# Patient Record
Sex: FEMALE | Race: WHITE | Employment: FULL TIME | URBAN - METROPOLITAN AREA
[De-identification: names, ages, dates, MRNs, and addresses within clinical notes are randomized per-mention and may not be internally consistent; named-entity substitution may affect disease eponyms.]

---

## 2024-05-16 ENCOUNTER — OFFICE VISIT (OUTPATIENT)
Dept: OBGYN CLINIC | Age: 34
End: 2024-05-16
Payer: COMMERCIAL

## 2024-05-16 VITALS
HEIGHT: 64 IN | WEIGHT: 154 LBS | BODY MASS INDEX: 26.29 KG/M2 | DIASTOLIC BLOOD PRESSURE: 82 MMHG | SYSTOLIC BLOOD PRESSURE: 126 MMHG

## 2024-05-16 DIAGNOSIS — B97.7 HPV IN FEMALE: Primary | ICD-10-CM

## 2024-05-16 PROBLEM — A60.00 GENITAL HERPES: Status: ACTIVE | Noted: 2024-05-16

## 2024-05-16 PROBLEM — Z30.41 ORAL CONTRACEPTIVE PILL SURVEILLANCE: Status: RESOLVED | Noted: 2023-10-16 | Resolved: 2024-05-16

## 2024-05-16 PROBLEM — N92.6 IRREGULAR MENSTRUAL BLEEDING: Status: RESOLVED | Noted: 2023-10-16 | Resolved: 2024-05-16

## 2024-05-16 PROBLEM — B37.31 CANDIDA VAGINITIS: Status: RESOLVED | Noted: 2023-10-16 | Resolved: 2024-05-16

## 2024-05-16 PROCEDURE — 99203 OFFICE O/P NEW LOW 30 MIN: CPT | Performed by: NURSE PRACTITIONER

## 2024-05-16 ASSESSMENT — PATIENT HEALTH QUESTIONNAIRE - PHQ9
2. FEELING DOWN, DEPRESSED OR HOPELESS: NOT AT ALL
1. LITTLE INTEREST OR PLEASURE IN DOING THINGS: NOT AT ALL
SUM OF ALL RESPONSES TO PHQ9 QUESTIONS 1 & 2: 0
SUM OF ALL RESPONSES TO PHQ QUESTIONS 1-9: 0

## 2024-05-16 NOTE — PROGRESS NOTES
Liban Carrillo is a 33 y.o.       Patient here for establishing care.   Patient would like to discuss TTC and conception education along with HPV/HSV counseling.   Patient states that with her last pap smear it also detected HSV. Denies hx of outbreaks.      LAST PAP:  10/13/2023, neg., HPV pos.   Patient denies ever getting Gardasil.      LAST MAMMO:  never     LMP:  Patient's last menstrual period was 2024 (exact date).     BIRTH CONTROL:  none-TTC      TOBACCO USE:  No      Patient's last menstrual period was 2024 (exact date).          Past Medical History:   Diagnosis Date    Headache     resolved since stopping BC    Herpes     History of depression     in her teens-patient states this has resolved    History of vitamin D deficiency        No past surgical history on file.    Family History   Problem Relation Age of Onset    Unknown Father     Anemia Mother     Heart Disease Mother     Breast Cancer Neg Hx     Colon Cancer Neg Hx     Uterine Cancer Neg Hx     Ovarian Cancer Neg Hx        Social History     Socioeconomic History    Marital status:      Spouse name: Not on file    Number of children: Not on file    Years of education: Not on file    Highest education level: Not on file   Occupational History    Not on file   Tobacco Use    Smoking status: Never     Passive exposure: Never    Smokeless tobacco: Never   Vaping Use    Vaping Use: Never used   Substance and Sexual Activity    Alcohol use: Not Currently    Drug use: Never    Sexual activity: Yes     Partners: Male     Birth control/protection: None   Other Topics Concern    Not on file   Social History Narrative    Not on file     Social Determinants of Health     Financial Resource Strain: Low Risk  (2023)    Overall Financial Resource Strain (CARDIA)     Difficulty of Paying Living Expenses: Not hard at all   Food Insecurity: Not on file (2023)   Transportation Needs: Unknown (2023)    PRAPARE - Transportation

## 2024-05-16 NOTE — ASSESSMENT & PLAN NOTE
Recent dx of HPV and HSV with pap 10/2023     Denies any hx outbreaks, + HSV noted on pap    Acog handouts reviewed with pt  Gardasil - not received, VIS reviewed and pt to let us know if she would like to proceed  We discuss etiology, transmission, prevention, treatment, and screening. We discuss limitation with serum hsv testing as well    Recommend pap in 1 year  Pt and spouse desire to TTC soon. We discuss ok to proceed with TTC, she is taking PNV. If pregnancy achieved, this will not limit our ability to do cervical screening  Plan prophylaxis in pregnancy at 36 weeks or sooner with any outbreaks    Reassurance provided to pt and f/u AE in October 2024

## 2024-05-16 NOTE — PROGRESS NOTES
Patient here for establishing care.   Patient would like to discuss TTC and conception education along with HPV/HSV counseling.   Patient states that with her last pap smear it also detected HSV. Denies hx of outbreaks.     LAST PAP:  10/13/2023, neg., HPV pos.   Patient denies ever getting Gardasil.     LAST MAMMO:  never    LMP:  Patient's last menstrual period was 04/22/2024 (exact date).    BIRTH CONTROL:  none-TTC     TOBACCO USE:  No    FAMILY HISTORY OF:   Breast Cancer:  No   Ovarian Cancer:  No   Uterine Cancer:  No   Colon Cancer:  No    Vitals:    05/16/24 1051   BP: 126/82   Site: Left Upper Arm   Position: Sitting   Weight: 69.9 kg (154 lb)   Height: 1.626 m (5' 4\")        INDIANA PHAM RN  05/16/24  11:00 AM

## 2024-07-10 RX ORDER — NORGESTIMATE AND ETHINYL ESTRADIOL
1 KIT DAILY
Qty: 84 TABLET | Refills: 3 | OUTPATIENT
Start: 2024-07-10

## 2024-10-17 ENCOUNTER — OFFICE VISIT (OUTPATIENT)
Dept: OBGYN CLINIC | Age: 34
End: 2024-10-17
Payer: COMMERCIAL

## 2024-10-17 VITALS
BODY MASS INDEX: 26.8 KG/M2 | SYSTOLIC BLOOD PRESSURE: 110 MMHG | HEIGHT: 64 IN | WEIGHT: 157 LBS | DIASTOLIC BLOOD PRESSURE: 68 MMHG

## 2024-10-17 DIAGNOSIS — Z31.69 PROCREATIVE MANAGEMENT COUNSELING: ICD-10-CM

## 2024-10-17 DIAGNOSIS — Z01.419 WOMEN'S ANNUAL ROUTINE GYNECOLOGICAL EXAMINATION: ICD-10-CM

## 2024-10-17 DIAGNOSIS — Z12.4 PAP SMEAR FOR CERVICAL CANCER SCREENING: Primary | ICD-10-CM

## 2024-10-17 PROCEDURE — 99459 PELVIC EXAMINATION: CPT | Performed by: NURSE PRACTITIONER

## 2024-10-17 PROCEDURE — 99395 PREV VISIT EST AGE 18-39: CPT | Performed by: NURSE PRACTITIONER

## 2024-10-17 NOTE — PROGRESS NOTES
Chaperone for Intimate Exam     Chaperone was offer accepted as part of the rooming process    Chaperone: Melissa WARD CMA

## 2024-10-17 NOTE — PROGRESS NOTES
Liban Carrillo is a 33 y.o.  who is here for AE.    Would like to discuss TTC. She and  have been trying for 6 months. Spouse is 35. He does not have any health issues/on any medications. He does vape nicotine. Denies alcohol/drug use for herself or partner. Spouse has never had genital surgery  They have been using period tracker brandy to time intercourse daily for 7 day fertile window (advised to change to every other day).         Patient's last menstrual period was 2024 (exact date).    Menses: Q 24 days, lasting 5 days, cramps on day 1. Heavy for 2 days and then light flow    Birth Control: none    Date of last Cervical Cancer screen (HPV or PAP): 10/13/2023 neg, + HPV    Hx abnormal pap or STD:HPV/HSV    Hx of receiving HPV vaccination:no, discussed at last visit, declined    No breast cancer screening on file    Fam Hx of Breast, ovarian or uterine cancer:denies    Sexually Active:yes    Number of partners in the last year:1          ROS:    Breast: Denies pain, lump or nipple discharge    GYN: Denies pelvic pain, discharge, itching, odor or dysuria.     Constitutional: Negative for chills and fever.     HENT: Negative for severe headaches or vision changes    Respiratory: Negative for cough and shortness of breath.      Cardiovascular: Negative for chest pain and palpitations.     Gastrointestinal: Negative for nausea and vomiting. Negative for diarrhea and constipation    Genitourinary: Negative for dysuria and hematuria.           Past Medical History:   Diagnosis Date    Abnormal Pap smear of cervix 10/13/23    HPV,    Headache     resolved since stopping BC    Herpes     History of depression     in her teens-patient states this has resolved    History of vitamin D deficiency     Migraine     Has since gone away after not continuing to use birth control       History reviewed. No pertinent surgical history.    Family History   Problem Relation Age of Onset    Unknown Father     Anemia

## 2024-10-17 NOTE — PROGRESS NOTES
Pt comes in today for AE.     LAST PAP:  10/13/2023 negative, HPV pos     LAST MAMMO:  never     LMP:  Patient's last menstrual period was 09/25/2024 (exact date).    BIRTH CONTROL:  none -TTC     TOBACCO USE:  No    FAMILY HISTORY OF:   Breast Cancer:  No   Ovarian Cancer:  No   Uterine Cancer:  No   Colon Cancer:  No    Vitals:    10/17/24 1503   BP: 110/68   Site: Left Upper Arm   Position: Sitting   Weight: 71.2 kg (157 lb)   Height: 1.626 m (5' 4\")        Melissa Irwin MA  10/17/24  3:08 PM

## 2024-10-17 NOTE — PATIENT INSTRUCTIONS
Please call when your next period starts. We will schedule an appointment for day 3 and day 21 of your period  Then we will see you in 2-3 months for an ultrasound and to review the lab results    Take a multivitamin that contains Folic Acid 400 mcg daily  We recommend a diet high in vegetables, fruits, whole grains, low fat dairy, and lean meats such as baked chicken and fish. Limit the amount of red meat, sweets, and sugary beverages (such as soda and sweet tea).   Exercise 30 minutes every day.  Drink no more than 200 mg of caffeine each day  Avoid alcohol, tobacco and drugs while trying to conceive  Time intercourse for every 1-2 days starting 5 days before ovulation until 1 day after ovulation.  Use a lubricant such as Pre-Seed that does not inhibit sperm motility

## 2024-10-17 NOTE — ASSESSMENT & PLAN NOTE
TTC x6 months  We discuss most couple will conceive within 12 months of times intercourse around ovulation  Pt is getting +OPKs at home  She would like to complete CD3 and CD 21 labs even though it has not been a full 12 months.  Pt will call with next period to schedule labs and f/u after this for US and visit      Take a multivitamin that contains Folic Acid 400 mcg daily  We recommend a diet high in vegetables, fruits, whole grains, low fat dairy, and lean meats such as baked chicken and fish. Limit the amount of red meat, sweets, and sugary beverages (such as soda and sweet tea).   Exercise 30 minutes every day.  Drink no more than 200 mg of caffeine each day  Avoid alcohol, tobacco and drugs while trying to conceive  Time intercourse for every 1-2 days starting 5 days before ovulation until 1 day after ovulation.  Use a lubricant such as Pre-Seed that does not inhibit sperm motility

## 2024-10-23 ENCOUNTER — LAB (OUTPATIENT)
Dept: OBGYN CLINIC | Age: 34
End: 2024-10-23

## 2024-10-23 DIAGNOSIS — Z31.69 PROCREATIVE MANAGEMENT COUNSELING: ICD-10-CM

## 2024-10-23 LAB
ALBUMIN SERPL-MCNC: 3.9 G/DL (ref 3.5–5)
ALBUMIN/GLOB SERPL: 1.3 (ref 1–1.9)
ALP SERPL-CCNC: 59 U/L (ref 35–104)
ALT SERPL-CCNC: 18 U/L (ref 8–45)
ANION GAP SERPL CALC-SCNC: 12 MMOL/L (ref 9–18)
AST SERPL-CCNC: 17 U/L (ref 15–37)
BILIRUB SERPL-MCNC: 0.3 MG/DL (ref 0–1.2)
BUN SERPL-MCNC: 9 MG/DL (ref 6–23)
CALCIUM SERPL-MCNC: 9.2 MG/DL (ref 8.8–10.2)
CHLORIDE SERPL-SCNC: 103 MMOL/L (ref 98–107)
CO2 SERPL-SCNC: 25 MMOL/L (ref 20–28)
CREAT SERPL-MCNC: 0.61 MG/DL (ref 0.6–1.1)
ERYTHROCYTE [DISTWIDTH] IN BLOOD BY AUTOMATED COUNT: 12.5 % (ref 11.9–14.6)
EST. AVERAGE GLUCOSE BLD GHB EST-MCNC: 108 MG/DL
ESTRADIOL SERPL-MCNC: 19.1 PG/ML
FSH SERPL-ACNC: 4.8 MIU/ML
GLOBULIN SER CALC-MCNC: 3.1 G/DL (ref 2.3–3.5)
GLUCOSE SERPL-MCNC: 80 MG/DL (ref 70–99)
HBA1C MFR BLD: 5.4 % (ref 0–5.6)
HCT VFR BLD AUTO: 40.9 % (ref 35.8–46.3)
HGB BLD-MCNC: 12.5 G/DL (ref 11.7–15.4)
LH SERPL-ACNC: 4.7 MIU/ML
MCH RBC QN AUTO: 27.5 PG (ref 26.1–32.9)
MCHC RBC AUTO-ENTMCNC: 30.6 G/DL (ref 31.4–35)
MCV RBC AUTO: 90.1 FL (ref 82–102)
NRBC # BLD: 0 K/UL (ref 0–0.2)
PLATELET # BLD AUTO: 273 K/UL (ref 150–450)
PMV BLD AUTO: 9.8 FL (ref 9.4–12.3)
POTASSIUM SERPL-SCNC: 4.4 MMOL/L (ref 3.5–5.1)
PROGEST SERPL-MCNC: 0.28 NG/ML
PROLACTIN SERPL-MCNC: 8.1 NG/ML (ref 4.8–23.3)
PROT SERPL-MCNC: 6.9 G/DL (ref 6.3–8.2)
RBC # BLD AUTO: 4.54 M/UL (ref 4.05–5.2)
SODIUM SERPL-SCNC: 140 MMOL/L (ref 136–145)
TSH W FREE THYROID IF ABNORMAL: 2.15 UIU/ML (ref 0.27–4.2)
WBC # BLD AUTO: 7.2 K/UL (ref 4.3–11.1)

## 2024-10-26 LAB
TESTOST FREE SERPL-MCNC: 0.8 PG/ML (ref 0–4.2)
TESTOST SERPL-MCNC: 18 NG/DL (ref 8–60)

## 2024-10-28 ENCOUNTER — TELEPHONE (OUTPATIENT)
Dept: OBGYN CLINIC | Age: 34
End: 2024-10-28

## 2024-10-28 LAB
COLLECTION METHOD: ABNORMAL
CYTOLOGIST CVX/VAG CYTO: ABNORMAL
CYTOLOGY CVX/VAG DOC THIN PREP: ABNORMAL
DATE OF LMP: ABNORMAL
HPV APTIMA: POSITIVE
HPV GENOTYPE REFLEX: ABNORMAL
Lab: ABNORMAL
PAP SOURCE: ABNORMAL
PATH REPORT.FINAL DX SPEC: ABNORMAL
PATHOLOGIST CVX/VAG CYTO: ABNORMAL
PATHOLOGIST PROVIDED ICD: ABNORMAL
RECOM F/U CVX/VAG CYTO: ABNORMAL
STAT OF ADQ CVX/VAG CYTO-IMP: ABNORMAL

## 2024-10-28 NOTE — TELEPHONE ENCOUNTER
Pap LGSIL +HPV. Recommend proceeding with colposcopy.  Please send pt acog handout on abnormal pap/colpo and schedule      We encourage you to stop smoking (if you do)  Take a multivitamin each day  Eat more cruciferous vegetables (sweet potatoes, spinach, kale, papaya, oranges, sweet peppers,and tomatoes) and other things high in antioxidants like green tea, pomegranate, dark chocolate, etc.  This will boost your immune system and help with your abnormal pap smear.   Exercise 30minutes/day  To help prevent the spread of sexually transmitted diseases, condoms must be worn during sexual activity.   If you have not yet received the HPV vaccine, we recommend you complete the series.- SCHEDULE WITH COLPO IF SHE WANTS    Please schedule a follow-up appointment in 12 months for another pap smear.

## 2024-10-30 LAB — MIS SERPL-MCNC: 2.96 NG/ML

## 2024-10-30 NOTE — TELEPHONE ENCOUNTER
Patient informed of results and recommendations. Patient states she will look over the information sent via My Chart, discuss it with Jesusita at her appointment on 12/10 then schedule her colposcopy. Patient was advised that I will schedule her AE for next year that will show up on My Chart. Information sent via My   Chart. Patient voiced understanding. melina

## 2024-11-11 ENCOUNTER — LAB (OUTPATIENT)
Dept: OBGYN CLINIC | Age: 34
End: 2024-11-11

## 2024-11-11 DIAGNOSIS — Z31.69 PROCREATIVE MANAGEMENT COUNSELING: Primary | ICD-10-CM

## 2024-11-11 LAB — PROGEST SERPL-MCNC: 2.35 NG/ML

## 2024-11-12 ENCOUNTER — TELEPHONE (OUTPATIENT)
Dept: OBGYN CLINIC | Age: 34
End: 2024-11-12

## 2024-11-12 NOTE — TELEPHONE ENCOUNTER
Progesterone level low for luteal phase CD 21.    Please call pt and confirm dates of last 3 menstrual cycles. We may have her repeat progesterone level based on these dates OR wait until her f/u visit in december

## 2024-11-16 PROBLEM — Z01.419 WOMEN'S ANNUAL ROUTINE GYNECOLOGICAL EXAMINATION: Status: RESOLVED | Noted: 2024-10-17 | Resolved: 2024-11-16

## 2024-12-09 NOTE — PROGRESS NOTES
Patient here for gyn f/u with US for procreative management.     LAST PAP:  10/18/2024, LSIL, HPV pos.     LAST MAMMO:  never     LMP:  Patient's last menstrual period was 11/21/2024 (exact date).    BIRTH CONTROL:  none-TTC    TOBACCO USE:  No    FAMILY HISTORY OF:   Breast Cancer:  No   Ovarian Cancer:  No   Uterine Cancer:  No   Colon Cancer:  No    Vitals:    12/10/24 1322   BP: 116/62   Site: Right Upper Arm   Position: Sitting   Weight: 72.1 kg (159 lb)   Height: 1.626 m (5' 4\")        INDIANA PHAM RN  12/10/24  1:25 PM

## 2024-12-10 ENCOUNTER — OFFICE VISIT (OUTPATIENT)
Dept: OBGYN CLINIC | Age: 34
End: 2024-12-10
Payer: COMMERCIAL

## 2024-12-10 ENCOUNTER — PROCEDURE VISIT (OUTPATIENT)
Dept: OBGYN CLINIC | Age: 34
End: 2024-12-10
Payer: COMMERCIAL

## 2024-12-10 VITALS
WEIGHT: 159 LBS | SYSTOLIC BLOOD PRESSURE: 116 MMHG | DIASTOLIC BLOOD PRESSURE: 62 MMHG | HEIGHT: 64 IN | BODY MASS INDEX: 27.14 KG/M2

## 2024-12-10 DIAGNOSIS — N92.6 IRREGULAR MENSES: Primary | ICD-10-CM

## 2024-12-10 DIAGNOSIS — B97.7 HPV IN FEMALE: ICD-10-CM

## 2024-12-10 DIAGNOSIS — Z31.69 PROCREATIVE MANAGEMENT COUNSELING: Primary | ICD-10-CM

## 2024-12-10 PROCEDURE — 76830 TRANSVAGINAL US NON-OB: CPT | Performed by: OBSTETRICS & GYNECOLOGY

## 2024-12-10 PROCEDURE — 99213 OFFICE O/P EST LOW 20 MIN: CPT | Performed by: NURSE PRACTITIONER

## 2024-12-10 SDOH — ECONOMIC STABILITY: INCOME INSECURITY: HOW HARD IS IT FOR YOU TO PAY FOR THE VERY BASICS LIKE FOOD, HOUSING, MEDICAL CARE, AND HEATING?: NOT HARD AT ALL

## 2024-12-10 SDOH — ECONOMIC STABILITY: FOOD INSECURITY: WITHIN THE PAST 12 MONTHS, THE FOOD YOU BOUGHT JUST DIDN'T LAST AND YOU DIDN'T HAVE MONEY TO GET MORE.: NEVER TRUE

## 2024-12-10 SDOH — ECONOMIC STABILITY: FOOD INSECURITY: WITHIN THE PAST 12 MONTHS, YOU WORRIED THAT YOUR FOOD WOULD RUN OUT BEFORE YOU GOT MONEY TO BUY MORE.: NEVER TRUE

## 2024-12-10 NOTE — ASSESSMENT & PLAN NOTE
5/16/2024: Recent dx of HPV and HSV with pap 10/2023      Denies any hx outbreaks, + HSV noted on pap     Acog handouts reviewed with pt  Gardasil - not received, VIS reviewed and pt to let us know if she would like to proceed  We discuss etiology, transmission, prevention, treatment, and screening. We discuss limitation with serum hsv testing as well     Recommend pap in 1 year  Pt and spouse desire to TTC soon. We discuss ok to proceed with TTC, she is taking PNV. If pregnancy achieved, this will not limit our ability to do cervical screening  Plan prophylaxis in pregnancy at 36 weeks or sooner with any outbreaks     Reassurance provided to pt and f/u AE in October 2024    12/10/24: pap LGSIL 10/18/2024  Colpo scheduled - pt needs to wait until after the new year due to work schedule

## 2024-12-10 NOTE — ASSESSMENT & PLAN NOTE
10/17/2024:   TTC x6 months  We discuss most couple will conceive within 12 months of times intercourse around ovulation  Pt is getting +OPKs at home  She would like to complete CD3 and CD 21 labs even though it has not been a full 12 months.  Pt will call with next period to schedule labs and f/u after this for US and visit        Take a multivitamin that contains Folic Acid 400 mcg daily  We recommend a diet high in vegetables, fruits, whole grains, low fat dairy, and lean meats such as baked chicken and fish. Limit the amount of red meat, sweets, and sugary beverages (such as soda and sweet tea).   Exercise 30 minutes every day.  Drink no more than 200 mg of caffeine each day  Avoid alcohol, tobacco and drugs while trying to conceive  Time intercourse for every 1-2 days starting 5 days before ovulation until 1 day after ovulation.  Use a lubricant such as Pre-Seed that does not inhibit sperm motility     12/10/24: US today uterus and ES nl, ovaries nl except small cyst noted to each ovary  Day 3 labs nl, day 21 prog slightly low. Will recheck with this cycle, pt is getting +OPKs at home

## 2024-12-10 NOTE — PROGRESS NOTES
Liban Carrillo is a 33 y.o.  who is here today for US and lab review    10/17/24: here for AE.   Would like to discuss TTC. She and  have been trying for 6 months. Spouse is 35. He does not have any health issues/on any medications. He does vape nicotine. Denies alcohol/drug use for herself or partner. Spouse has never had genital surgery  They have been using period tracker brandy to time intercourse daily for 7 day fertile window (advised to change to every other day).    Patient's last menstrual period was 2024 (exact date).   Menses: Q 24 days, lasting 5 days, cramps on day 1. Heavy for 2 days and then light flow   Birth Control: none   Date of last Cervical Cancer screen (HPV or PAP): 10/13/2023 neg, + HPV   Hx abnormal pap or STD:HPV/HSV   Hx of receiving HPV vaccination:no, discussed at last visit, declined   No breast cancer screening on file   Fam Hx of Breast, ovarian or uterine cancer:denies   Sexually Active:yes   Number of partners in the last year:1      Patient's last menstrual period was 2024 (exact date).  Date of last Cervical Cancer screen (HPV or PAP): 10/18/2024        Past Medical History:   Diagnosis Date    Abnormal Pap smear of cervix 10/13/23    HPV,    Headache     resolved since stopping BC    Herpes     History of depression     in her teens-patient states this has resolved    History of vitamin D deficiency     Migraine     Has since gone away after not continuing to use birth control       History reviewed. No pertinent surgical history.    Family History   Problem Relation Age of Onset    Unknown Father     Anemia Mother     Heart Disease Mother     Migraines Mother     Migraines Maternal Grandmother     Breast Cancer Neg Hx     Colon Cancer Neg Hx     Uterine Cancer Neg Hx     Ovarian Cancer Neg Hx        Social History     Socioeconomic History    Marital status:      Spouse name: Not on file    Number of children: Not on file    Years of education:

## 2024-12-10 NOTE — PROGRESS NOTES
I have reviewed the patient's visit today including history, exam and assessment by SIMEON Steiner.  I agree with treatment/plan as above.    Teddy Ordoñez MD  1:45 PM  12/10/24

## 2024-12-12 ENCOUNTER — LAB (OUTPATIENT)
Dept: OBGYN CLINIC | Age: 34
End: 2024-12-12

## 2024-12-12 DIAGNOSIS — Z31.69 PROCREATIVE MANAGEMENT COUNSELING: Primary | ICD-10-CM

## 2024-12-12 DIAGNOSIS — Z31.69 PROCREATIVE MANAGEMENT COUNSELING: ICD-10-CM

## 2024-12-12 LAB — PROGEST SERPL-MCNC: 9.98 NG/ML

## 2025-01-06 ENCOUNTER — OFFICE VISIT (OUTPATIENT)
Dept: PRIMARY CARE CLINIC | Facility: CLINIC | Age: 35
End: 2025-01-06
Payer: COMMERCIAL

## 2025-01-06 VITALS
OXYGEN SATURATION: 99 % | TEMPERATURE: 97.9 F | DIASTOLIC BLOOD PRESSURE: 80 MMHG | HEART RATE: 63 BPM | SYSTOLIC BLOOD PRESSURE: 112 MMHG | WEIGHT: 163 LBS | HEIGHT: 64 IN | BODY MASS INDEX: 27.83 KG/M2

## 2025-01-06 DIAGNOSIS — F43.9 STRESS: ICD-10-CM

## 2025-01-06 DIAGNOSIS — Z76.89 ENCOUNTER TO ESTABLISH CARE WITH NEW DOCTOR: Primary | ICD-10-CM

## 2025-01-06 PROCEDURE — 99214 OFFICE O/P EST MOD 30 MIN: CPT | Performed by: FAMILY MEDICINE

## 2025-01-06 RX ORDER — BUSPIRONE HYDROCHLORIDE 5 MG/1
5 TABLET ORAL 3 TIMES DAILY PRN
Qty: 90 TABLET | Refills: 0 | Status: SHIPPED | OUTPATIENT
Start: 2025-01-06 | End: 2025-02-05

## 2025-01-06 RX ORDER — RIZATRIPTAN BENZOATE 10 MG/1
10 TABLET ORAL
COMMUNITY

## 2025-01-06 ASSESSMENT — PATIENT HEALTH QUESTIONNAIRE - PHQ9
SUM OF ALL RESPONSES TO PHQ9 QUESTIONS 1 & 2: 0
SUM OF ALL RESPONSES TO PHQ QUESTIONS 1-9: 0
2. FEELING DOWN, DEPRESSED OR HOPELESS: NOT AT ALL
SUM OF ALL RESPONSES TO PHQ QUESTIONS 1-9: 0
1. LITTLE INTEREST OR PLEASURE IN DOING THINGS: NOT AT ALL

## 2025-01-06 ASSESSMENT — ENCOUNTER SYMPTOMS
VOMITING: 0
DIARRHEA: 0
ABDOMINAL PAIN: 0
SHORTNESS OF BREATH: 0
NAUSEA: 0
COUGH: 0

## 2025-01-06 NOTE — ASSESSMENT & PLAN NOTE
Chronic issue but worse due to difficult job and stress during the holiday.  Trying to get pregnant with her .  Discussed stress may play a role in this.  Will try on Buspirone as needed to help with increased stress.  Recheck in six months.

## 2025-01-06 NOTE — PROGRESS NOTES
[facility]  Jocelin Ordoñez, DO  [address], [place] [address] [phone]      ASSESSMENT AND PLAN    Problem List Items Addressed This Visit          Other    Encounter to establish care with new doctor - Primary    Stress     Chronic issue but worse due to difficult job and stress during the holiday.  Trying to get pregnant with her .  Discussed stress may play a role in this.  Will try on Buspirone as needed to help with increased stress.  Recheck in six months.             The diagnoses and plan were discussed with the patient, who verbalizes understanding and agrees with plan.  All questions answered.    Chief Complaint    Chief Complaint   Patient presents with    New Patient    Establish Care       HISTORY OF PRESENT ILLNESS    34 y.o. female presents today to establish care with a new primary care provider.  Sees Jesusita Gan NP at Ob/Gyn, trying to conceive.  Stopped birth control back in April, had irregular periods when she came off, starting to become more regular.  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  States that at times she feels overwhelmed but does not want to take a medicine that may change her personality.  States that she started having migraines about 3 years ago.  States that she tried Excedrin Migraine, which helped at first.  States they got worse and notes that when she stopped the birth control it improved.  States that they are starting to come back, maybe once a week.  Believes they are stress induced.  Notes that she tries to exercise or do yoga, which helps.      PAST MEDICAL HISTORY    Past Medical History:   Diagnosis Date    Abnormal Pap smear of cervix 10/13/23    HPV,    Headache     resolved since stopping BC    Herpes     History of depression     in her teens-patient states this has resolved    History of

## 2025-01-06 NOTE — PATIENT INSTRUCTIONS
IT WAS GREAT TO SEE YOU TODAY!    PLEASE WORK ON DIET - EAT MORE LEAN PROTEINS (CHICKEN, FISH, BEANS, TURKEY), FRUITS, VEGETABLES AND DRINK MORE WATER.  EAT LESS RED MEAT, DAIRY PRODUCTS, PROCESSED STARCHES (WHITE RICE, PASTA, WHITE BREAD, TORTILLAS, CHIPS, BAKED GOODS, CANDY), AND DRINK LESS SODA, ENERGY DRINKS, JUICE, TEA, COFFEE DRINKS AND ALCOHOL.  TRY TO EAT THREE MEALS A DAY WITH SOME SORT OF PROTEIN AND TRY TO CUT BACK ON SNACKS (UNLESS IT IS HEALTHY - VEGGIES AND HUMMUS, ONE SERVING OF UNSALTED NUTS, ONE SERVING OF FRUIT, ETC).  PAY ATTENTION TO SERVING SIZES ON THE PACKAGES SO YOU DO NOT EAT LARGER PORTIONS.    Please try to do some form of aerobic exercise at least 3-4 times per week for about 20-30 minutes at a time.  Aerobic exercise can include walking, hiking, jogging, swimming or using an elliptical machine.  You can also do light weights or consider doing free exercises on your smart TV.  [employer] has multiple free exercise videos that include yoga, kickboxing, pilates, aerobics, etc.    PLEASE TAKE ALL MEDICATION AS DISCUSSED.    ~USE THE BUSPIRONE UP TO THREE TIMES A DAY IF NEEDED FOR STRESS.  YOU DO NOT HAVE TO TAKE IT EVERY DAY.      I WILL SEE YOU AGAIN IN 6 MONTHS BUT PLEASE CALL WITH CONCERNS [phone]

## 2025-02-01 RX ORDER — BUSPIRONE HYDROCHLORIDE 5 MG/1
5 TABLET ORAL 3 TIMES DAILY PRN
Qty: 270 TABLET | Refills: 1 | Status: SHIPPED | OUTPATIENT
Start: 2025-02-01 | End: 2025-07-31

## 2025-02-10 ENCOUNTER — TELEPHONE (OUTPATIENT)
Dept: OBGYN CLINIC | Age: 35
End: 2025-02-10

## 2025-02-10 RX ORDER — ONDANSETRON 4 MG/1
4 TABLET, ORALLY DISINTEGRATING ORAL 3 TIMES DAILY PRN
Qty: 30 TABLET | Refills: 1 | Status: SHIPPED | OUTPATIENT
Start: 2025-02-10

## 2025-02-10 NOTE — TELEPHONE ENCOUNTER
Pt is requesting nausea medication. Pt stated she has tried all medications listed in prenatal booklet but nothing has helped. Pt has new OB visit on 02/24/2025

## 2025-02-10 NOTE — TELEPHONE ENCOUNTER
Zofran rx sent.  Review the following with pt     prev study that showed potential 0.25% increase in cardiac defect in patients that used Zofran prior to 12 weeks has since been disproven.  Also that cont N/V in pregnancy with weight loss and potential dehydration carries more risks than potential risks of Zofran.

## 2025-02-22 PROBLEM — O98.511 HERPES VIRUS INFECTION IN MOTHER DURING FIRST TRIMESTER OF PREGNANCY: Status: ACTIVE | Noted: 2025-02-22

## 2025-02-22 PROBLEM — Z34.01 PRIMIGRAVIDA, FIRST TRIMESTER: Status: ACTIVE | Noted: 2025-02-22

## 2025-02-22 PROBLEM — Z76.89 ENCOUNTER TO ESTABLISH CARE WITH NEW DOCTOR: Status: RESOLVED | Noted: 2025-01-06 | Resolved: 2025-02-22

## 2025-02-22 PROBLEM — B00.9 HERPES VIRUS INFECTION IN MOTHER DURING FIRST TRIMESTER OF PREGNANCY: Status: ACTIVE | Noted: 2025-02-22

## 2025-02-24 ENCOUNTER — PROCEDURE VISIT (OUTPATIENT)
Dept: OBGYN CLINIC | Age: 35
End: 2025-02-24
Payer: COMMERCIAL

## 2025-02-24 ENCOUNTER — ROUTINE PRENATAL (OUTPATIENT)
Dept: OBGYN CLINIC | Age: 35
End: 2025-02-24

## 2025-02-24 VITALS
DIASTOLIC BLOOD PRESSURE: 68 MMHG | SYSTOLIC BLOOD PRESSURE: 112 MMHG | WEIGHT: 164 LBS | HEIGHT: 64 IN | BODY MASS INDEX: 28 KG/M2

## 2025-02-24 DIAGNOSIS — O98.511 HERPES VIRUS INFECTION IN MOTHER DURING FIRST TRIMESTER OF PREGNANCY: ICD-10-CM

## 2025-02-24 DIAGNOSIS — B00.9 HERPES VIRUS INFECTION IN MOTHER DURING FIRST TRIMESTER OF PREGNANCY: ICD-10-CM

## 2025-02-24 DIAGNOSIS — F43.9 STRESS: ICD-10-CM

## 2025-02-24 DIAGNOSIS — Z34.01 PRIMIGRAVIDA, FIRST TRIMESTER: Primary | ICD-10-CM

## 2025-02-24 DIAGNOSIS — O36.80X0 ENCOUNTER TO DETERMINE FETAL VIABILITY OF PREGNANCY, SINGLE OR UNSPECIFIED FETUS: Primary | ICD-10-CM

## 2025-02-24 DIAGNOSIS — Z34.01 PRIMIGRAVIDA IN FIRST TRIMESTER: ICD-10-CM

## 2025-02-24 DIAGNOSIS — Z34.01 PRIMIGRAVIDA, FIRST TRIMESTER: ICD-10-CM

## 2025-02-24 DIAGNOSIS — G43.009 MIGRAINE WITHOUT AURA AND WITHOUT STATUS MIGRAINOSUS, NOT INTRACTABLE: ICD-10-CM

## 2025-02-24 PROBLEM — A60.00 GENITAL HERPES: Status: RESOLVED | Noted: 2024-05-16 | Resolved: 2025-02-24

## 2025-02-24 LAB
ABO + RH BLD: NORMAL
BLOOD GROUP ANTIBODIES SERPL: NORMAL
ERYTHROCYTE [DISTWIDTH] IN BLOOD BY AUTOMATED COUNT: 12.6 % (ref 11.9–14.6)
EST. AVERAGE GLUCOSE BLD GHB EST-MCNC: 106 MG/DL
HBA1C MFR BLD: 5.3 % (ref 0–5.6)
HBV SURFACE AG SER QL: NONREACTIVE
HCT VFR BLD AUTO: 38.1 % (ref 35.8–46.3)
HCV AB SER QL: NONREACTIVE
HGB BLD-MCNC: 12.2 G/DL (ref 11.7–15.4)
HIV 1+2 AB+HIV1 P24 AG SERPL QL IA: NONREACTIVE
HIV 1/2 RESULT COMMENT: NORMAL
MCH RBC QN AUTO: 28.1 PG (ref 26.1–32.9)
MCHC RBC AUTO-ENTMCNC: 32 G/DL (ref 31.4–35)
MCV RBC AUTO: 87.8 FL (ref 82–102)
NRBC # BLD: 0 K/UL (ref 0–0.2)
PLATELET # BLD AUTO: 285 K/UL (ref 150–450)
PMV BLD AUTO: 9.8 FL (ref 9.4–12.3)
RBC # BLD AUTO: 4.34 M/UL (ref 4.05–5.2)
RUBV IGG SERPL IA-ACNC: 47.3 IU/ML
T PALLIDUM AB SER QL IA: NONREACTIVE
WBC # BLD AUTO: 9.7 K/UL (ref 4.3–11.1)

## 2025-02-24 PROCEDURE — 76801 OB US < 14 WKS SINGLE FETUS: CPT | Performed by: OBSTETRICS & GYNECOLOGY

## 2025-02-24 PROCEDURE — 0500F INITIAL PRENATAL CARE VISIT: CPT | Performed by: NURSE PRACTITIONER

## 2025-02-24 NOTE — PROGRESS NOTES
Patient comes in today for initial prenatal visit. Pt has been nauseous but taking zofran.     Fetal Movements:  No  Contractions:  No  Vaginal Bleeding:  No  Leaking Fluid:  No  GI/ issues:  No    Drug/Alcohol 4P's Plus Screening    1.  Have either of your parents ever had a problem with drugs/alcohol/prescription drugs? Yes  2.  Does your partner have a problem with drugs/alcohol/prescription drugs?  No  3.  In the past, have you ever had a problem with drugs/alcohol/prescription drugs?  No  4.  Before you were pregnant, in the past month, have you done any drugs, drank any alcohol or abused any prescription drugs?    No  If \"YES\" to any of the above, please give further details:  Pt father had problem.     LAST PAP:  10/18/2024 LSIL, HPV pos     LAST MAMMO:  never     LMP:  Patient's last menstrual period was 12/18/2024.    FAMILY HISTORY OF:   Breast Cancer:  No   Ovarian Cancer:  No   Uterine Cancer:  No   Colon Cancer:  No    Vitals:    02/24/25 1317   BP: 112/68   Site: Left Upper Arm   Position: Sitting   Weight: 74.4 kg (164 lb)   Height: 1.626 m (5' 4\")        Melissa Irwin MA  02/24/25  2:09 PM

## 2025-02-24 NOTE — ASSESSMENT & PLAN NOTE
History reviewed  PNV's ordered (if not already taking)  PN labs, UDS ordered  Problem list reviewed  OB physical completed  OB prenatal packet/education reviewed: Information included discusses general pregnancy topics, fetal development, weight gain, nutrition, breastfeeding, risky behaviors, WIC, vaccinations and hospital information.    RTO 4 weeks OBV with OB colpo  PTL/labor precautions, FMC, and pregnancy warning signs reviewed.      Genetic testing - D/W pt at length genetic testing that is recommended by ACOG -- NIPT, Quad screen (for trisomies and NTD), CF, SMA.  Brief discussion of these diseases - conditions that may increase risks, etiology, carrier states, fetal effects, treatment options, etc was undertaken. D/W pt that these are screening tests/carrier screening test only and are NOT mandatory. We also discuss false POS/false neg rates. It is her decision whether to have them done and how to proceed with the information afterwards.

## 2025-02-24 NOTE — PROGRESS NOTES
HPI    This is a 34 y.o.   at 9w5d for new OB visit.        Her Estimated Due Date is 2025, by Last Menstrual Period    Denies leaking of fluid, vaginal bleeding, or regular contractions.     C/o nausea. Taking zofran      Current Outpatient Medications on File Prior to Visit   Medication Sig Dispense Refill    ondansetron (ZOFRAN-ODT) 4 MG disintegrating tablet Take 1 tablet by mouth 3 times daily as needed for Nausea or Vomiting 30 tablet 1    Prenatal Vit-Fe Fumarate-FA (PRENATAL VITAMIN PO) Take by mouth      rizatriptan (MAXALT) 10 MG tablet Take 1 tablet by mouth once as needed for Migraine May repeat in 2 hours if needed (Patient not taking: Reported on 2025)       No current facility-administered medications on file prior to visit.       No Known Allergies        OB History    Para Term  AB Living   1 0 0 0 0 0   SAB IAB Ectopic Molar Multiple Live Births   0 0 0 0 0 0       # 1 - Date: None, Sex: None, Weight: None, GA: None, Type: None, Apgar1: None, Apgar5: None, Living: None, Birth Comments: None          Past Medical History:   Diagnosis Date    Abnormal Pap smear of cervix 10/13/23    HPV,    Genital herpes 2024    Headache     resolved since stopping BC    Herpes     History of depression     in her teens-patient states this has resolved    History of vitamin D deficiency     Migraine     Has since gone away after not continuing to use birth control       History reviewed. No pertinent surgical history.    Family History   Problem Relation Age of Onset    Unknown Father     Anemia Mother     Heart Disease Mother     Migraines Mother     Migraines Maternal Grandmother     Breast Cancer Neg Hx     Colon Cancer Neg Hx     Uterine Cancer Neg Hx     Ovarian Cancer Neg Hx        Social History     Socioeconomic History    Marital status:      Spouse name: Not on file    Number of children: Not on file    Years of education: Not on file    Highest education

## 2025-02-25 DIAGNOSIS — O98.511 HERPES VIRUS INFECTION IN MOTHER DURING FIRST TRIMESTER OF PREGNANCY: ICD-10-CM

## 2025-02-25 DIAGNOSIS — B00.9 HERPES VIRUS INFECTION IN MOTHER DURING FIRST TRIMESTER OF PREGNANCY: ICD-10-CM

## 2025-02-25 DIAGNOSIS — Z34.01 PRIMIGRAVIDA, FIRST TRIMESTER: Primary | ICD-10-CM

## 2025-02-25 PROBLEM — Z67.91 RH NEGATIVE STATE IN ANTEPARTUM PERIOD, FIRST TRIMESTER: Status: ACTIVE | Noted: 2025-02-25

## 2025-02-25 PROBLEM — O26.891 RH NEGATIVE STATE IN ANTEPARTUM PERIOD, FIRST TRIMESTER: Status: ACTIVE | Noted: 2025-02-25

## 2025-02-26 LAB — HGB FRACT BLD ELPH-IMP: NORMAL

## 2025-03-02 LAB
Lab: NORMAL
NTRA FETAL FRACTION: NORMAL
NTRA FETAL RHD SUMMARY: NORMAL
NTRA GENDER OF FETUS: NORMAL
NTRA MONOSOMY X AGE-BASED RISK TEXT: NORMAL
NTRA MONOSOMY X RESULT TEXT: NORMAL
NTRA MONOSOMY X RISK SCORE TEXT: NORMAL
NTRA TRIPLOIDY RESULT TEXT: NORMAL
NTRA TRISOMY 13 AGE-BASED RISK TEXT: NORMAL
NTRA TRISOMY 13 RESULT TEXT: NORMAL
NTRA TRISOMY 13 RISK SCORE TEXT: NORMAL
NTRA TRISOMY 18 AGE-BASED RISK TEXT: NORMAL
NTRA TRISOMY 18 RESULT TEXT: NORMAL
NTRA TRISOMY 18 RISK SCORE TEXT: NORMAL
NTRA TRISOMY 21 AGE-BASED RISK TEXT: NORMAL
NTRA TRISOMY 21 RESULT TEXT: NORMAL
NTRA TRISOMY 21 RISK SCORE TEXT: NORMAL

## 2025-03-05 LAB
Lab: ABNORMAL
Lab: POSITIVE
NTRA CYSTIC FIBROSIS: POSITIVE
NTRA DUCHENNE/BECKER MUSCULAR DYSTROPHY: NEGATIVE
NTRA FRAGILE X SYNDROME: NEGATIVE
NTRA SPINAL MUSCULAR ATROPHY: NEGATIVE

## 2025-03-06 ENCOUNTER — TELEPHONE (OUTPATIENT)
Dept: OBGYN CLINIC | Age: 35
End: 2025-03-06

## 2025-03-06 DIAGNOSIS — Z14.1 CYSTIC FIBROSIS CARRIER IN FIRST TRIMESTER, ANTEPARTUM: Primary | ICD-10-CM

## 2025-03-06 DIAGNOSIS — O09.891 CYSTIC FIBROSIS CARRIER IN FIRST TRIMESTER, ANTEPARTUM: Primary | ICD-10-CM

## 2025-03-06 NOTE — TELEPHONE ENCOUNTER
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 If the FOB also carried the gene, The baby would have to inherit the gene from both she and FOB which is a 25% chance      Please send pt via [employer] the ACOG CF handout  Offer testing for FOB - if she wants we can arrange via [employer]

## 2025-03-06 NOTE — TELEPHONE ENCOUNTER
Called patient, reviewed providers note regarding CF with patient.   Reviewed that patient is a carrier for CF, explained what that means and offered testing for FOB.   Patient verbalized understanding and confirms she wants FOB to be tested at next visit on 4/3/25.     Patient knows I sent mychart message recapping our phone call regarding the results and ACOG handout.

## 2025-03-18 ENCOUNTER — TELEPHONE (OUTPATIENT)
Dept: OBGYN CLINIC | Age: 35
End: 2025-03-18

## 2025-03-18 NOTE — TELEPHONE ENCOUNTER
Jesusita,     Patient called stating that she just left [facility] in [place] and was dx with viral infection (was negative for flu/COVID/RSV) and was told she has lost 8 pounds since her visit on 2/24/2025.    Fetal Movement: No (12 weeks and 6 days   Contractions: No  Vaginal Bleeding: No  Leaking Fluid: No  GI/: Yes- n/v, has been taking zofran PRN, is helpful. Recommended to add other PN booklet meds.   Aches/fever/chills: no   Weight: 156lb   Medications prescribed: no     Reviewed PN booklet medications to help with her HA, cough, and congestion.   Reviewed ways to increase caloric intake.   Reviewed with patient to make sure she has adequate fluid intake.     Any other recommendations for patient?

## 2025-03-18 NOTE — TELEPHONE ENCOUNTER
Called patient to let her know that provider agreed with recommendations and instructions given to patient in telephone encounter for her symptoms.   Patient verbalized understanding and knows to call us if any new or worsening changes occur.

## 2025-04-01 PROBLEM — F43.9 STRESS: Status: RESOLVED | Noted: 2025-01-06 | Resolved: 2025-04-01

## 2025-04-01 PROBLEM — Z31.69 PROCREATIVE MANAGEMENT COUNSELING: Status: RESOLVED | Noted: 2024-10-17 | Resolved: 2025-04-01

## 2025-04-01 PROBLEM — O26.899 RH NEGATIVE STATE IN ANTEPARTUM PERIOD: Status: ACTIVE | Noted: 2025-02-25

## 2025-04-01 PROBLEM — Z34.02 PRIMIGRAVIDA IN SECOND TRIMESTER: Status: ACTIVE | Noted: 2025-02-22

## 2025-04-01 PROBLEM — O98.512 HERPES VIRUS INFECTION IN MOTHER DURING SECOND TRIMESTER OF PREGNANCY: Status: ACTIVE | Noted: 2025-02-22

## 2025-04-03 ENCOUNTER — PROCEDURE VISIT (OUTPATIENT)
Dept: OBGYN CLINIC | Age: 35
End: 2025-04-03
Payer: COMMERCIAL

## 2025-04-03 VITALS — SYSTOLIC BLOOD PRESSURE: 110 MMHG | DIASTOLIC BLOOD PRESSURE: 70 MMHG | BODY MASS INDEX: 28.84 KG/M2 | WEIGHT: 168 LBS

## 2025-04-03 DIAGNOSIS — Z34.02 PRIMIGRAVIDA IN SECOND TRIMESTER: ICD-10-CM

## 2025-04-03 DIAGNOSIS — Z14.1 CYSTIC FIBROSIS CARRIER: ICD-10-CM

## 2025-04-03 DIAGNOSIS — O26.899 RH NEGATIVE STATE IN ANTEPARTUM PERIOD: ICD-10-CM

## 2025-04-03 DIAGNOSIS — B00.9 HERPES VIRUS INFECTION IN MOTHER DURING SECOND TRIMESTER OF PREGNANCY: ICD-10-CM

## 2025-04-03 DIAGNOSIS — O98.511 HERPES VIRUS INFECTION IN MOTHER DURING FIRST TRIMESTER OF PREGNANCY: ICD-10-CM

## 2025-04-03 DIAGNOSIS — Z67.91 RH NEGATIVE STATE IN ANTEPARTUM PERIOD: ICD-10-CM

## 2025-04-03 DIAGNOSIS — O98.512 HERPES VIRUS INFECTION IN MOTHER DURING SECOND TRIMESTER OF PREGNANCY: ICD-10-CM

## 2025-04-03 DIAGNOSIS — Z34.01 PRIMIGRAVIDA, FIRST TRIMESTER: ICD-10-CM

## 2025-04-03 DIAGNOSIS — R87.612 LGSIL ON PAP SMEAR OF CERVIX: Primary | ICD-10-CM

## 2025-04-03 DIAGNOSIS — B00.9 HERPES VIRUS INFECTION IN MOTHER DURING FIRST TRIMESTER OF PREGNANCY: ICD-10-CM

## 2025-04-03 LAB
AMPHET UR QL SCN: NEGATIVE
BARBITURATES UR QL SCN: NEGATIVE
BENZODIAZ UR QL: NEGATIVE
CANNABINOIDS UR QL SCN: NEGATIVE
COCAINE UR QL SCN: NEGATIVE
METHADONE UR QL: NEGATIVE
OPIATES UR QL: NEGATIVE
PCP UR QL: NEGATIVE

## 2025-04-03 PROCEDURE — 57452 EXAM OF CERVIX W/SCOPE: CPT | Performed by: OBSTETRICS & GYNECOLOGY

## 2025-04-03 PROCEDURE — 0502F SUBSEQUENT PRENATAL CARE: CPT | Performed by: OBSTETRICS & GYNECOLOGY

## 2025-04-03 NOTE — PROGRESS NOTES
[facility]  [address] [facility], [place] [address]  [phone]    Teddy Ordoñez MD, FACOG  Jesusita Gan EM[facility]    Colposcopy Procedure Note      Indications: This is a 34 y.o. White (non-) female,  who presents for a colposcopy.  Pap smear showed:     Problem List Items Addressed This Visit          G1     Primigravida in second trimester    Instructed pt to contact the office or seek immediate care if develops fever > 101.0, severe lower abdominal pain or heavy vaginal bleeding (soaking 2 or more pads per hour).    (+) FHTs today - 154  D/W pt at length genetic testing that is recommended by ACOG -- NIPT, Quad screen (for trisomies and NTD), CF, SMA.  Brief discussion of these diseases - conditions that may increase risks, etiology, carrier states, fetal effects, treatment options, etc was undertaken. D/W pt that these are screening tests only and are NOT mandatory. It is her decision whether to have them done and how to proceed with the information afterwards.  All questions answered, pt understood and wishes to proceed with indicated tests.          Relevant Orders    Alpha Fetoprotein, Maternal       Other    Rh negative state in antepartum period    noted         LGSIL on Pap smear of cervix - Primary    As per colpo form         Relevant Orders    COLPOSCOPY,CERVIX W/ADJ VAGINA (Completed)    Herpes virus infection in mother during second trimester of pregnancy    No prodrome or lesions per or on exam today         Cystic fibrosis carrier    FOB to RTC when available for testing             Procedure:  After informed consent obtained, pt placed in dorsal lithotomy position. Bivalved speculum placed in vagina without difficulty.  Acetic acid placed over entire face of cervix.  Colposcopy performed in usually fashion.  Entire SCJ seen - adequate colpo.  Acetowhite changes noted from 10-11, 1-2, 6-7 o'clock  No Bx done due to IUP  Pt tolerated procedure well.      Physical

## 2025-04-03 NOTE — ASSESSMENT & PLAN NOTE
Instructed pt to contact the office or seek immediate care if develops fever > 101.0, severe lower abdominal pain or heavy vaginal bleeding (soaking 2 or more pads per hour).    (+) FHTs today - 154  D/W pt at length genetic testing that is recommended by ACOG -- NIPT, Quad screen (for trisomies and NTD), CF, SMA.  Brief discussion of these diseases - conditions that may increase risks, etiology, carrier states, fetal effects, treatment options, etc was undertaken. D/W pt that these are screening tests only and are NOT mandatory. It is her decision whether to have them done and how to proceed with the information afterwards.  All questions answered, pt understood and wishes to proceed with indicated tests.

## 2025-04-03 NOTE — PROGRESS NOTES
Chaperone for Intimate Exam     Chaperone was offer accepted as part of the rooming process    Chaperone: Eleni Gerard

## 2025-04-03 NOTE — PROGRESS NOTES
Patient comes in today for routine prenatal visit. No complaints/concerns today.     Fetal Movement: No  Contractions: No  Vaginal Bleeding: No  Leaking Fluid: No  GI/: No    Vitals:    04/03/25 0931   BP: 110/70   BP Site: Left Upper Arm   Patient Position: Sitting   Weight: 76.2 kg (168 lb)

## 2025-04-04 ENCOUNTER — RESULTS FOLLOW-UP (OUTPATIENT)
Dept: OBGYN CLINIC | Age: 35
End: 2025-04-04

## 2025-04-07 LAB
AFP INTERP SERPL-IMP: NORMAL
AFP MOM SERPL: 0.93
AFP SERPL-MCNC: 25.3 NG/ML
AGE AT DELIVERY: 33.7 YR
COMMENT: NORMAL
DONOR EGG?: NO
GA METHOD: NORMAL
GA: 15.1 WEEKS
IDDM PATIENT QL: NO
INSULIN DEP. DIABETIC: 2688
Lab: 168
Lab: NORMAL
MAT SCN FOR FETAL ABNORMALITIES SERPL: NORMAL
MULTIPLE PREGNANCY: NO
NEURAL TUBE DEFECT RISK FETUS: NORMAL
NUMBER OF FETUSES: NO
OTHER INDICATIONS: NO
OTHER INDICATIONS: NORMAL
PREVIOUSLY ELEVATED AFP (Y OR N): 15.1
PREVIOUSLY ELEVATED AFP (Y OR N): NO
PRIOR 1ST TRIM TESTING ?: NO
PRIOR 2ND TRIM TESTING ?: NO
PRIOR DS/NTD SCREEN CURRENT PREGNANCY?: NO
PRIOR PREGNANCY WITH DOWN SYNDROME (Y OR N): 1
PRIOR PREGNANCY WITH DOWN SYNDROME (Y OR N): NO
TYPE OF EGG DONOR: NORMAL

## 2025-04-08 ENCOUNTER — RESULTS FOLLOW-UP (OUTPATIENT)
Dept: OBGYN CLINIC | Age: 35
End: 2025-04-08

## 2025-04-08 DIAGNOSIS — Z34.02 PRIMIGRAVIDA IN SECOND TRIMESTER: Primary | ICD-10-CM

## 2025-04-08 LAB
C TRACH RRNA SPEC QL NAA+PROBE: NEGATIVE
N GONORRHOEA RRNA SPEC QL NAA+PROBE: NEGATIVE
SPECIMEN SOURCE: NORMAL
T VAGINALIS RRNA SPEC QL NAA+PROBE: NEGATIVE

## 2025-05-03 SDOH — ECONOMIC STABILITY: FOOD INSECURITY: WITHIN THE PAST 12 MONTHS, YOU WORRIED THAT YOUR FOOD WOULD RUN OUT BEFORE YOU GOT MONEY TO BUY MORE.: NEVER TRUE

## 2025-05-03 SDOH — ECONOMIC STABILITY: TRANSPORTATION INSECURITY
IN THE PAST 12 MONTHS, HAS THE LACK OF TRANSPORTATION KEPT YOU FROM MEDICAL APPOINTMENTS OR FROM GETTING MEDICATIONS?: NO

## 2025-05-03 SDOH — ECONOMIC STABILITY: TRANSPORTATION INSECURITY
IN THE PAST 12 MONTHS, HAS LACK OF TRANSPORTATION KEPT YOU FROM MEETINGS, WORK, OR FROM GETTING THINGS NEEDED FOR DAILY LIVING?: NO

## 2025-05-03 SDOH — ECONOMIC STABILITY: FOOD INSECURITY: WITHIN THE PAST 12 MONTHS, THE FOOD YOU BOUGHT JUST DIDN'T LAST AND YOU DIDN'T HAVE MONEY TO GET MORE.: NEVER TRUE

## 2025-05-03 SDOH — ECONOMIC STABILITY: INCOME INSECURITY: IN THE LAST 12 MONTHS, WAS THERE A TIME WHEN YOU WERE NOT ABLE TO PAY THE MORTGAGE OR RENT ON TIME?: NO

## 2025-05-06 ENCOUNTER — PROCEDURE VISIT (OUTPATIENT)
Dept: OBGYN CLINIC | Age: 35
End: 2025-05-06
Payer: COMMERCIAL

## 2025-05-06 ENCOUNTER — ROUTINE PRENATAL (OUTPATIENT)
Dept: OBGYN CLINIC | Age: 35
End: 2025-05-06

## 2025-05-06 VITALS
HEIGHT: 64 IN | DIASTOLIC BLOOD PRESSURE: 64 MMHG | SYSTOLIC BLOOD PRESSURE: 112 MMHG | BODY MASS INDEX: 28.17 KG/M2 | WEIGHT: 165 LBS

## 2025-05-06 DIAGNOSIS — Z14.1 CYSTIC FIBROSIS CARRIER: ICD-10-CM

## 2025-05-06 DIAGNOSIS — O98.512 HERPES VIRUS INFECTION IN MOTHER DURING SECOND TRIMESTER OF PREGNANCY: ICD-10-CM

## 2025-05-06 DIAGNOSIS — Z36.89 ENCOUNTER FOR FETAL ANATOMIC SURVEY: Primary | ICD-10-CM

## 2025-05-06 DIAGNOSIS — Z34.02 PRIMIGRAVIDA IN SECOND TRIMESTER: ICD-10-CM

## 2025-05-06 DIAGNOSIS — Z67.91 RH NEGATIVE STATE IN ANTEPARTUM PERIOD: ICD-10-CM

## 2025-05-06 DIAGNOSIS — O26.899 RH NEGATIVE STATE IN ANTEPARTUM PERIOD: ICD-10-CM

## 2025-05-06 DIAGNOSIS — Z34.02 PRIMIGRAVIDA IN SECOND TRIMESTER: Primary | ICD-10-CM

## 2025-05-06 DIAGNOSIS — B00.9 HERPES VIRUS INFECTION IN MOTHER DURING SECOND TRIMESTER OF PREGNANCY: ICD-10-CM

## 2025-05-06 LAB
ALBUMIN SERPL-MCNC: 2.9 G/DL (ref 3.5–5)
ALBUMIN/GLOB SERPL: 0.7 (ref 1–1.9)
ALP SERPL-CCNC: 58 U/L (ref 35–104)
ALT SERPL-CCNC: 18 U/L (ref 8–45)
ANION GAP SERPL CALC-SCNC: 12 MMOL/L (ref 7–16)
AST SERPL-CCNC: 18 U/L (ref 15–37)
BILIRUB SERPL-MCNC: 0.2 MG/DL (ref 0–1.2)
BUN SERPL-MCNC: 7 MG/DL (ref 6–23)
CALCIUM SERPL-MCNC: 9.3 MG/DL (ref 8.8–10.2)
CHLORIDE SERPL-SCNC: 103 MMOL/L (ref 98–107)
CO2 SERPL-SCNC: 22 MMOL/L (ref 20–29)
CREAT SERPL-MCNC: 0.46 MG/DL (ref 0.6–1.1)
ERYTHROCYTE [DISTWIDTH] IN BLOOD BY AUTOMATED COUNT: 13.1 % (ref 11.9–14.6)
GLOBULIN SER CALC-MCNC: 4.2 G/DL (ref 2.3–3.5)
GLUCOSE SERPL-MCNC: 92 MG/DL (ref 70–99)
HCT VFR BLD AUTO: 37.1 % (ref 35.8–46.3)
HGB BLD-MCNC: 12 G/DL (ref 11.7–15.4)
MCH RBC QN AUTO: 29 PG (ref 26.1–32.9)
MCHC RBC AUTO-ENTMCNC: 32.3 G/DL (ref 31.4–35)
MCV RBC AUTO: 89.6 FL (ref 82–102)
NRBC # BLD: 0 K/UL (ref 0–0.2)
PLATELET # BLD AUTO: 282 K/UL (ref 150–450)
PMV BLD AUTO: 9.7 FL (ref 9.4–12.3)
POTASSIUM SERPL-SCNC: 3.9 MMOL/L (ref 3.5–5.1)
PROT SERPL-MCNC: 7.1 G/DL (ref 6.3–8.2)
RBC # BLD AUTO: 4.14 M/UL (ref 4.05–5.2)
SODIUM SERPL-SCNC: 137 MMOL/L (ref 136–145)
TSH W FREE THYROID IF ABNORMAL: 1.24 UIU/ML (ref 0.27–4.2)
WBC # BLD AUTO: 12.8 K/UL (ref 4.3–11.1)

## 2025-05-06 PROCEDURE — 0502F SUBSEQUENT PRENATAL CARE: CPT | Performed by: NURSE PRACTITIONER

## 2025-05-06 PROCEDURE — 76805 OB US >/= 14 WKS SNGL FETUS: CPT | Performed by: OBSTETRICS & GYNECOLOGY

## 2025-05-06 NOTE — PROGRESS NOTES
Patient comes in today for routine prenatal visit.     Patient would like recommendations to hep reduce upper back strain/discomfort.   Patient sits at a desk for work as a , she has gotten upper back support for her chair and tries to get up more frequently to stretch.     Patient has also noticed increased hair loss.     Fetal Movement: Yes-possible flutters   Contractions: No  Vaginal Bleeding: No  Leaking Fluid: No  GI/: no     Vitals:    05/06/25 1400   BP: 112/64   BP Site: Right Upper Arm   Patient Position: Sitting   Weight: 74.8 kg (165 lb)   Height: 1.626 m (5' 4\")

## 2025-05-06 NOTE — PROGRESS NOTES
Patient comes in today for routine prenatal visit. No complaints/concerns today.     Fetal Movement: {YES/NO:19726}  Contractions: {YES/NO:19726}  Vaginal Bleeding: {YES/NO:19726}  Leaking Fluid: {YES/NO:19726}  GI/: {YES/NO:19726}    There were no vitals filed for this visit.

## 2025-05-06 NOTE — ASSESSMENT & PLAN NOTE
PTL/labor precautions, FMC, and pregnancy warning signs reviewed. Pt advised to call the office at [phone] or go straight to Labor and Delivery at [facility] with any of the following concerns vaginal bleeding, leaking of fluid, mindy regularly Q 5-7 minutes for over an hour or not feeling the baby move.   Rto 4 weeks OBV  Anatomy US today normal    We discuss upper back pain relief strategies with posture, chair and leg mechanics, heat, tylenol, biofreeze  Hair loss - will check labs today but likely hormonal related to pregnancy

## 2025-05-06 NOTE — PROGRESS NOTES
This is a 34 y.o.   at 19w6d for routine OB visit.    Her Estimated Due Date is 2025, by Last Menstrual Period    Denies leaking of fluid, vaginal bleeding, or regular contractions. Reports fetal movement.     C/o upper back pain and hair loss    Current Outpatient Medications on File Prior to Visit   Medication Sig Dispense Refill    ondansetron (ZOFRAN-ODT) 4 MG disintegrating tablet Take 1 tablet by mouth 3 times daily as needed for Nausea or Vomiting 30 tablet 1    Prenatal Vit-Fe Fumarate-FA (PRENATAL VITAMIN PO) Take by mouth       No current facility-administered medications on file prior to visit.       No Known Allergies    OB History    Para Term  AB Living   1 0 0 0 0 0   SAB IAB Ectopic Molar Multiple Live Births   0 0 0 0 0 0       # 1 - Date: None, Sex: None, Weight: None, GA: None, Type: None, Apgar1: None, Apgar5: None, Living: None, Birth Comments: None        Past Medical History:   Diagnosis Date    Abnormal Pap smear of cervix 10/13/23    HPV,    Genital herpes 2024    Headache     resolved since stopping BC    Herpes     History of depression     in her teens-patient states this has resolved    History of vitamin D deficiency     Migraine     Has since gone away after not continuing to use birth control       History reviewed. No pertinent surgical history.    Family History   Problem Relation Age of Onset    Unknown Father     Anemia Mother     Heart Disease Mother     Migraines Mother     Migraines Maternal Grandmother     Breast Cancer Neg Hx     Colon Cancer Neg Hx     Uterine Cancer Neg Hx     Ovarian Cancer Neg Hx        Social History     Socioeconomic History    Marital status:      Spouse name: Not on file    Number of children: Not on file    Years of education: Not on file    Highest education level: Not on file   Occupational History    Not on file   Tobacco Use    Smoking status: Never     Passive exposure: Never    Smokeless tobacco: Never

## 2025-05-07 ENCOUNTER — TELEPHONE (OUTPATIENT)
Dept: OBGYN CLINIC | Age: 35
End: 2025-05-07

## 2025-05-07 ENCOUNTER — RESULTS FOLLOW-UP (OUTPATIENT)
Dept: OBGYN CLINIC | Age: 35
End: 2025-05-07

## 2025-05-07 NOTE — PROGRESS NOTES
I have reviewed the patient's visit today including history, exam and assessment by SIMEON Steiner.  I agree with treatment/plan as above.    Teddy Ordoñez MD  7:55 AM  05/07/25

## 2025-05-07 NOTE — TELEPHONE ENCOUNTER
Patients spouse jenny honeycutt called stating that he received an automated message from [employer] and was wondering what this was about and if it was about his wife Liban Honeycutt.     Called patient herself and told her our office did not have anything to call patient about, the only update from today was that her CBC labs came back normal per providers message via [employer].     Patient verbalized understanding and stated she would let her  know this.

## 2025-06-04 ENCOUNTER — ROUTINE PRENATAL (OUTPATIENT)
Dept: OBGYN CLINIC | Age: 35
End: 2025-06-04

## 2025-06-04 VITALS
SYSTOLIC BLOOD PRESSURE: 108 MMHG | WEIGHT: 173 LBS | BODY MASS INDEX: 29.53 KG/M2 | HEIGHT: 64 IN | DIASTOLIC BLOOD PRESSURE: 70 MMHG

## 2025-06-04 DIAGNOSIS — B00.9 HERPES VIRUS INFECTION IN MOTHER DURING SECOND TRIMESTER OF PREGNANCY: ICD-10-CM

## 2025-06-04 DIAGNOSIS — O26.899 RH NEGATIVE STATE IN ANTEPARTUM PERIOD: ICD-10-CM

## 2025-06-04 DIAGNOSIS — Z34.02 PRIMIGRAVIDA IN SECOND TRIMESTER: Primary | ICD-10-CM

## 2025-06-04 DIAGNOSIS — Z67.91 RH NEGATIVE STATE IN ANTEPARTUM PERIOD: ICD-10-CM

## 2025-06-04 DIAGNOSIS — O98.512 HERPES VIRUS INFECTION IN MOTHER DURING SECOND TRIMESTER OF PREGNANCY: ICD-10-CM

## 2025-06-04 PROCEDURE — 0502F SUBSEQUENT PRENATAL CARE: CPT | Performed by: NURSE PRACTITIONER

## 2025-06-04 NOTE — ASSESSMENT & PLAN NOTE
PTL/labor precautions, FMC, and pregnancy warning signs reviewed. Pt advised to call the office at [phone] or go straight to Labor and Delivery at [facility] with any of the following concerns vaginal bleeding, leaking of fluid, mindy regularly Q 5-7 minutes for over an hour or not feeling the baby move.   Rto 4 weeks OBV, glucola, cbc, rpr, tdap/rhogam

## 2025-06-04 NOTE — PROGRESS NOTES
I have reviewed the patient's visit today including history, exam and assessment by SIMEON Steiner.  I agree with treatment/plan as above.    Teddy Ordoñez MD  9:48 AM  06/04/25   
Patient comes in today for routine prenatal visit. No complaints/concerns today.     Patient states she used Monistat 7 day and her symptoms have subsided. Patient denies any symptoms today, she declined being swab at today's visit.     Fetal Movement: Yes  Contractions: No  Vaginal Bleeding: No  Leaking Fluid: No  GI/: No    Vitals:    06/04/25 0857   BP: 108/70   BP Site: Left Upper Arm   Patient Position: Sitting   Weight: 78.5 kg (173 lb)   Height: 1.626 m (5' 4\")      
acute distress    Head: normocephalic and atraumatic    Resp: even and unlabored    Psych: Normal mood and affect        Assessment and Plan      Patient Active Problem List    Diagnosis Date Noted    Cystic fibrosis carrier 03/06/2025     Overview Note:     3/6/25: partner testing offered  5/6/25: partner testing today Jay Carrillo [birth year] (negative)      Rh negative state in antepartum period 02/25/2025     Overview Note:     Rhogam at 28 weeks and PP -- fetus RHD Positive       Assessment & Plan Note:      noted      Primigravida in second trimester 02/22/2025     Overview Note:     EDC by LMP confirmed by 9 1/7 week US    3/2/25: NIPT low risk, MALE, SMA, DMD, Fragile X neg, CF carrier  4/3/25:  AFP neg         Assessment & Plan Note:      PTL/labor precautions, FMC, and pregnancy warning signs reviewed. Pt advised to call the office at [phone] or go straight to Labor and Delivery at [facility] with any of the following concerns vaginal bleeding, leaking of fluid, mindy regularly Q 5-7 minutes for over an hour or not feeling the baby move.   Rto 4 weeks OBV, glucola, cbc, rpr, tdap/rhogam      Herpes virus infection in mother during second trimester of pregnancy 02/22/2025     Overview Note:     Dx via pap smear in the past. Pt denies any outbreaks.     PLAN:  propho around 35 weeks or sooner if outbreak occurs       Assessment & Plan Note:      noted      LGSIL on Pap smear of cervix 05/16/2024     Overview Note:     On pap done 10/18/24      Migraine without aura and without status migrainosus, not intractable 10/16/2023     Overview Note:     Stable on meds, continue it  On Maxalt as needed  Reviewed side effects and safety precautions  Advised to monitor and report to ED/ER for any thunder clap headache or worsening headaches ina changes in cognition, vision.         Problem List Items Addressed This Visit          Other    Rh negative state in antepartum period     noted

## 2025-07-01 PROBLEM — O98.513 HERPES VIRUS INFECTION IN MOTHER DURING THIRD TRIMESTER OF PREGNANCY: Status: ACTIVE | Noted: 2025-02-22

## 2025-07-01 PROBLEM — Z34.03 PRIMIGRAVIDA IN THIRD TRIMESTER: Status: ACTIVE | Noted: 2025-02-22

## 2025-07-01 NOTE — PROGRESS NOTES
Patient comes in today for routine prenatal visit. No complaints/concerns today.     Draw glucola @ 10:10am      Tdap VIS sheet given. Defers today, will let us know at next visit.     Fetal Movement: Yes  Contractions: No  Vaginal Bleeding: No  Leaking Fluid: No  GI/: No    Vitals:    07/02/25 0907   BP: 114/66   BP Site: Left Upper Arm   Patient Position: Sitting   Weight: 80.7 kg (178 lb)   Height: 1.626 m (5' 4\")

## 2025-07-02 ENCOUNTER — ROUTINE PRENATAL (OUTPATIENT)
Dept: OBGYN CLINIC | Age: 35
End: 2025-07-02
Payer: COMMERCIAL

## 2025-07-02 VITALS
DIASTOLIC BLOOD PRESSURE: 66 MMHG | WEIGHT: 178 LBS | BODY MASS INDEX: 30.39 KG/M2 | SYSTOLIC BLOOD PRESSURE: 114 MMHG | HEIGHT: 64 IN

## 2025-07-02 DIAGNOSIS — Z34.03 PRIMIGRAVIDA IN THIRD TRIMESTER: ICD-10-CM

## 2025-07-02 DIAGNOSIS — B00.9 HERPES VIRUS INFECTION IN MOTHER DURING THIRD TRIMESTER OF PREGNANCY: ICD-10-CM

## 2025-07-02 DIAGNOSIS — O98.513 HERPES VIRUS INFECTION IN MOTHER DURING THIRD TRIMESTER OF PREGNANCY: ICD-10-CM

## 2025-07-02 DIAGNOSIS — O26.899 RH NEGATIVE STATE IN ANTEPARTUM PERIOD: Primary | ICD-10-CM

## 2025-07-02 DIAGNOSIS — Z67.91 RH NEGATIVE STATE IN ANTEPARTUM PERIOD: Primary | ICD-10-CM

## 2025-07-02 DIAGNOSIS — Z14.1 CYSTIC FIBROSIS CARRIER: ICD-10-CM

## 2025-07-02 LAB
ERYTHROCYTE [DISTWIDTH] IN BLOOD BY AUTOMATED COUNT: 13.3 % (ref 11.9–14.6)
GLUCOSE 1 HOUR: 101 MG/DL
HCT VFR BLD AUTO: 34.3 % (ref 35.8–46.3)
HGB BLD-MCNC: 10.5 G/DL (ref 11.7–15.4)
MCH RBC QN AUTO: 27.6 PG (ref 26.1–32.9)
MCHC RBC AUTO-ENTMCNC: 30.6 G/DL (ref 31.4–35)
MCV RBC AUTO: 90.3 FL (ref 82–102)
NRBC # BLD: 0 K/UL (ref 0–0.2)
PLATELET # BLD AUTO: 268 K/UL (ref 150–450)
PMV BLD AUTO: 9.4 FL (ref 9.4–12.3)
RBC # BLD AUTO: 3.8 M/UL (ref 4.05–5.2)
T PALLIDUM AB SER QL IA: NONREACTIVE
WBC # BLD AUTO: 10.1 K/UL (ref 4.3–11.1)

## 2025-07-02 PROCEDURE — 0502F SUBSEQUENT PRENATAL CARE: CPT | Performed by: NURSE PRACTITIONER

## 2025-07-02 PROCEDURE — 96372 THER/PROPH/DIAG INJ SC/IM: CPT | Performed by: NURSE PRACTITIONER

## 2025-07-02 NOTE — ASSESSMENT & PLAN NOTE
PTL/labor precautions, FMC, and pregnancy warning signs reviewed. Pt advised to call the office at [phone] or go straight to Labor and Delivery at [facility] with any of the following concerns vaginal bleeding, leaking of fluid, mindy regularly Q 5-7 minutes for over an hour or not feeling the baby move.   Rto 2 weeks OBV/US  Glucola, cbc, rpr today

## 2025-07-02 NOTE — PROGRESS NOTES
This is a 34 y.o.   at 28w0d for routine OB visit.    Her Estimated Due Date is 2025, by Last Menstrual Period    Denies leaking of fluid, vaginal bleeding, or regular contractions. Reports fetal movement.     Current Outpatient Medications on File Prior to Visit   Medication Sig Dispense Refill    ondansetron (ZOFRAN-ODT) 4 MG disintegrating tablet Take 1 tablet by mouth 3 times daily as needed for Nausea or Vomiting 30 tablet 1    Prenatal Vit-Fe Fumarate-FA (PRENATAL VITAMIN PO) Take by mouth       No current facility-administered medications on file prior to visit.       No Known Allergies    OB History    Para Term  AB Living   1 0 0 0 0 0   SAB IAB Ectopic Molar Multiple Live Births   0 0 0 0 0 0       # 1 - Date: None, Sex: None, Weight: None, GA: None, Type: None, Apgar1: None, Apgar5: None, Living: None, Birth Comments: None        Past Medical History:   Diagnosis Date    Abnormal Pap smear of cervix 10/13/23    HPV,    Genital herpes 2024    Headache     resolved since stopping BC    Herpes     History of depression     in her teens-patient states this has resolved    History of vitamin D deficiency     Migraine     Has since gone away after not continuing to use birth control       No past surgical history on file.    Family History   Problem Relation Age of Onset    Unknown Father     Anemia Mother     Heart Disease Mother     Migraines Mother     Migraines Maternal Grandmother     Breast Cancer Neg Hx     Colon Cancer Neg Hx     Uterine Cancer Neg Hx     Ovarian Cancer Neg Hx        Social History     Socioeconomic History    Marital status:      Spouse name: Not on file    Number of children: Not on file    Years of education: Not on file    Highest education level: Not on file   Occupational History    Not on file   Tobacco Use    Smoking status: Never     Passive exposure: Never    Smokeless tobacco: Never   Vaping Use    Vaping status: Never Used

## 2025-07-02 NOTE — PROGRESS NOTES
I have reviewed the patient's visit today including history, exam and assessment by SIMEON Steiner.  I agree with treatment/plan as above.    Teddy Ordoñez MD  10:53 AM  07/02/25

## 2025-07-03 ENCOUNTER — TELEPHONE (OUTPATIENT)
Dept: OBGYN CLINIC | Age: 35
End: 2025-07-03

## 2025-07-03 RX ORDER — DOCUSATE SODIUM 100 MG/1
100 CAPSULE, LIQUID FILLED ORAL 2 TIMES DAILY
Qty: 60 CAPSULE | Refills: 2 | Status: SHIPPED | OUTPATIENT
Start: 2025-07-03

## 2025-07-03 RX ORDER — FERROUS SULFATE 325(65) MG
325 TABLET ORAL DAILY
Qty: 30 TABLET | Refills: 2 | Status: SHIPPED | OUTPATIENT
Start: 2025-07-03

## 2025-07-03 NOTE — TELEPHONE ENCOUNTER
Please call patient and let her know that her hemoglobin was low. She needs to start taking       FeSO4 325mg PO ONCE DAILY Disp: 30 RF:2  Colace 100mg PO BID Disp: 60 RF: 2 prn for constipation      Recommend taking iron on empty stomach or with foods rich in Vitamin C     Encourage foods that are high in iron (I.e. red meats, green leafy vegetables, peanut butter)    Also increase fluids and foods high in fiber to prevent constipation.         Passed glucola

## 2025-07-18 ENCOUNTER — PROCEDURE VISIT (OUTPATIENT)
Dept: OBGYN CLINIC | Age: 35
End: 2025-07-18

## 2025-07-18 ENCOUNTER — ROUTINE PRENATAL (OUTPATIENT)
Dept: OBGYN CLINIC | Age: 35
End: 2025-07-18

## 2025-07-18 VITALS
DIASTOLIC BLOOD PRESSURE: 72 MMHG | SYSTOLIC BLOOD PRESSURE: 108 MMHG | HEIGHT: 64 IN | WEIGHT: 180 LBS | BODY MASS INDEX: 30.73 KG/M2

## 2025-07-18 DIAGNOSIS — Z14.1 CYSTIC FIBROSIS CARRIER: ICD-10-CM

## 2025-07-18 DIAGNOSIS — O26.843 UTERINE SIZE DATE DISCREPANCY PREGNANCY, THIRD TRIMESTER: Primary | ICD-10-CM

## 2025-07-18 DIAGNOSIS — B00.9 HERPES VIRUS INFECTION IN MOTHER DURING THIRD TRIMESTER OF PREGNANCY: ICD-10-CM

## 2025-07-18 DIAGNOSIS — R87.612 LGSIL ON PAP SMEAR OF CERVIX: ICD-10-CM

## 2025-07-18 DIAGNOSIS — Z34.03 PRIMIGRAVIDA IN THIRD TRIMESTER: Primary | ICD-10-CM

## 2025-07-18 DIAGNOSIS — O98.513 HERPES VIRUS INFECTION IN MOTHER DURING THIRD TRIMESTER OF PREGNANCY: ICD-10-CM

## 2025-07-18 DIAGNOSIS — Z34.03 PRIMIGRAVIDA IN THIRD TRIMESTER: ICD-10-CM

## 2025-07-18 PROCEDURE — 0502F SUBSEQUENT PRENATAL CARE: CPT | Performed by: OBSTETRICS & GYNECOLOGY

## 2025-07-18 NOTE — PROGRESS NOTES
Patient comes in today for routine prenatal visit. No complaints/concerns today.     Fetal Movement: Yes  Contractions: No  Vaginal Bleeding: No  Leaking Fluid: No  GI/: No    Vitals:    07/18/25 0853   BP: 108/72   BP Site: Left Upper Arm   Patient Position: Sitting   Weight: 81.6 kg (180 lb)   Height: 1.626 m (5' 4\")

## 2025-07-18 NOTE — PROGRESS NOTES
Chief Complaint   Patient presents with    Routine Prenatal Visit    Pregnancy Ultrasound        This 34 y.o.  at 30w2d with Estimated Date of Delivery: 25 presents for routine prenatal visit. Patient has no complaints today. Pt reports good FM, no LOF, VB, ctx. Pt denies H/A, vision changes, abdom pain, N/V.    Vitals:    25 0853   BP: 108/72   BP Site: Left Upper Arm   Patient Position: Sitting   Weight: 81.6 kg (180 lb)   Height: 1.626 m (5' 4\")        Patient Active Problem List    Diagnosis Date Noted    Cystic fibrosis carrier 2025     Overview Note:     3/6/25: partner testing offered  25: partner testing today Jay Gerardo [birth year] (negative)  25:  EFW 54%, AC 56%, ROSMERY 15.3 cm, vtx       Assessment & Plan Note:     noted      Rh negative state in antepartum period 2025     Overview Note:     Rhogam at 28 weeks and PP -- fetus RHD Positive  25: rhogam given      Primigravida in third trimester 2025     Overview Note:     EDC by LMP confirmed by 9  week US    3/2/25: NIPT low risk, MALE, SMA, DMD, Fragile X neg, CF carrier  4/3/25:  AFP neg  25: declines tdap  25:  EFW 54%, AC 56%, ROSMERY 15.3 cm, vtx         Assessment & Plan Note:     Educated patient of signs and symptoms of  labor including but not limited to regular uterine contractions every 5-7 minutes for 1 hour, vaginal bleeding or leakage of fluid to seek immediate care.       Herpes virus infection in mother during third trimester of pregnancy 2025     Overview Note:     Dx via pap smear in the past. Pt denies any outbreaks.     PLAN:  propho around 35 weeks or sooner if outbreak occurs       Assessment & Plan Note:     No prodrome or lesions per pt      LGSIL on Pap smear of cervix 2024     Overview Note:     On pap done 10/18/24       Assessment & Plan Note:     noted      Migraine without aura and without status migrainosus, not intractable 10/16/2023     Overview Note:

## 2025-07-31 ENCOUNTER — ROUTINE PRENATAL (OUTPATIENT)
Dept: OBGYN CLINIC | Age: 35
End: 2025-07-31

## 2025-07-31 VITALS
HEIGHT: 64 IN | SYSTOLIC BLOOD PRESSURE: 110 MMHG | DIASTOLIC BLOOD PRESSURE: 68 MMHG | BODY MASS INDEX: 31.41 KG/M2 | WEIGHT: 184 LBS

## 2025-07-31 DIAGNOSIS — Z67.91 RH NEGATIVE STATE IN ANTEPARTUM PERIOD: ICD-10-CM

## 2025-07-31 DIAGNOSIS — G43.009 MIGRAINE WITHOUT AURA AND WITHOUT STATUS MIGRAINOSUS, NOT INTRACTABLE: ICD-10-CM

## 2025-07-31 DIAGNOSIS — O98.513 HERPES VIRUS INFECTION IN MOTHER DURING THIRD TRIMESTER OF PREGNANCY: ICD-10-CM

## 2025-07-31 DIAGNOSIS — B00.9 HERPES VIRUS INFECTION IN MOTHER DURING THIRD TRIMESTER OF PREGNANCY: ICD-10-CM

## 2025-07-31 DIAGNOSIS — R87.612 LGSIL ON PAP SMEAR OF CERVIX: ICD-10-CM

## 2025-07-31 DIAGNOSIS — Z14.1 CYSTIC FIBROSIS CARRIER: ICD-10-CM

## 2025-07-31 DIAGNOSIS — Z34.03 PRIMIGRAVIDA IN THIRD TRIMESTER: Primary | ICD-10-CM

## 2025-07-31 DIAGNOSIS — O26.899 RH NEGATIVE STATE IN ANTEPARTUM PERIOD: ICD-10-CM

## 2025-07-31 PROCEDURE — 0502F SUBSEQUENT PRENATAL CARE: CPT | Performed by: OBSTETRICS & GYNECOLOGY

## 2025-07-31 NOTE — PROGRESS NOTES
Patient comes in today for routine prenatal visit. No complaints/concerns today.     Fetal Movement: Yes  Contractions: No  Vaginal Bleeding: No  Leaking Fluid: No  GI/: No    Vitals:    07/31/25 1401   BP: 110/68   BP Site: Left Upper Arm   Patient Position: Sitting   Weight: 83.5 kg (184 lb)   Height: 1.626 m (5' 4\")

## 2025-07-31 NOTE — PROGRESS NOTES
Chief Complaint   Patient presents with    Routine Prenatal Visit        This 34 y.o.  at 32w1d with Estimated Date of Delivery: 25 presents for routine prenatal visit. Patient has no complaints today. Pt reports good FM, no LOF, VB, ctx. Pt denies H/A, vision changes, abdom pain, N/V.    Vitals:    25 1401   BP: 110/68   BP Site: Left Upper Arm   Patient Position: Sitting   Weight: 83.5 kg (184 lb)   Height: 1.626 m (5' 4\")        Patient Active Problem List    Diagnosis Date Noted    Cystic fibrosis carrier 2025     Overview Note:     3/6/25: partner testing offered  25: partner testing today Jay Gerardo [birth year] (negative)  25:  EFW 54%, AC 56%, ROSMERY 15.3 cm, vtx       Assessment & Plan Note:     noted      Rh negative state in antepartum period 2025     Overview Note:     Rhogam at 28 weeks and PP -- fetus RHD Positive  25: rhogam given       Assessment & Plan Note:     noted      Primigravida in third trimester 2025     Overview Note:     EDC by LMP confirmed by 9  week US    3/2/25: NIPT low risk, MALE, SMA, DMD, Fragile X neg, CF carrier  4/3/25:  AFP neg  25: declines tdap  25:  EFW 54%, AC 56%, ROSMERY 15.3 cm, vtx         Assessment & Plan Note:     Educated patient of signs and symptoms of  labor including but not limited to regular uterine contractions every 5-7 minutes for 1 hour, vaginal bleeding or leakage of fluid to seek immediate care.       Herpes virus infection in mother during third trimester of pregnancy 2025     Overview Note:     Dx via pap smear in the past. Pt denies any outbreaks.     PLAN:  propho around 35 weeks or sooner if outbreak occurs       Assessment & Plan Note:     No prodrome or lesions per pt      LGSIL on Pap smear of cervix 2024     Overview Note:     On pap done 10/18/24       Assessment & Plan Note:     noted      Migraine without aura and without status migrainosus, not intractable 10/16/2023

## 2025-08-18 ENCOUNTER — ROUTINE PRENATAL (OUTPATIENT)
Dept: OBGYN CLINIC | Age: 35
End: 2025-08-18

## 2025-08-18 ENCOUNTER — PROCEDURE VISIT (OUTPATIENT)
Dept: OBGYN CLINIC | Age: 35
End: 2025-08-18
Payer: COMMERCIAL

## 2025-08-18 VITALS
BODY MASS INDEX: 31.41 KG/M2 | HEIGHT: 64 IN | DIASTOLIC BLOOD PRESSURE: 80 MMHG | WEIGHT: 184 LBS | SYSTOLIC BLOOD PRESSURE: 114 MMHG

## 2025-08-18 DIAGNOSIS — R87.612 LGSIL ON PAP SMEAR OF CERVIX: ICD-10-CM

## 2025-08-18 DIAGNOSIS — Z14.1 CYSTIC FIBROSIS CARRIER: ICD-10-CM

## 2025-08-18 DIAGNOSIS — O26.843 UTERINE SIZE DATE DISCREPANCY PREGNANCY, THIRD TRIMESTER: Primary | ICD-10-CM

## 2025-08-18 DIAGNOSIS — O98.513 HERPES VIRUS INFECTION IN MOTHER DURING THIRD TRIMESTER OF PREGNANCY: ICD-10-CM

## 2025-08-18 DIAGNOSIS — B00.9 HERPES VIRUS INFECTION IN MOTHER DURING THIRD TRIMESTER OF PREGNANCY: ICD-10-CM

## 2025-08-18 DIAGNOSIS — Z34.03 PRIMIGRAVIDA IN THIRD TRIMESTER: Primary | ICD-10-CM

## 2025-08-18 DIAGNOSIS — Z34.03 PRIMIGRAVIDA IN THIRD TRIMESTER: ICD-10-CM

## 2025-08-18 DIAGNOSIS — O26.899 RH NEGATIVE STATE IN ANTEPARTUM PERIOD: ICD-10-CM

## 2025-08-18 DIAGNOSIS — Z67.91 RH NEGATIVE STATE IN ANTEPARTUM PERIOD: ICD-10-CM

## 2025-08-18 PROCEDURE — 0502F SUBSEQUENT PRENATAL CARE: CPT | Performed by: OBSTETRICS & GYNECOLOGY

## 2025-08-18 PROCEDURE — 76816 OB US FOLLOW-UP PER FETUS: CPT | Performed by: OBSTETRICS & GYNECOLOGY

## 2025-08-18 RX ORDER — VALACYCLOVIR HYDROCHLORIDE 500 MG/1
500 TABLET, FILM COATED ORAL 2 TIMES DAILY
Qty: 60 TABLET | Refills: 2 | Status: SHIPPED | OUTPATIENT
Start: 2025-08-18

## 2025-08-26 ENCOUNTER — ROUTINE PRENATAL (OUTPATIENT)
Dept: OBGYN CLINIC | Age: 35
End: 2025-08-26

## 2025-08-26 VITALS
SYSTOLIC BLOOD PRESSURE: 116 MMHG | BODY MASS INDEX: 31.92 KG/M2 | DIASTOLIC BLOOD PRESSURE: 64 MMHG | WEIGHT: 187 LBS | HEIGHT: 64 IN

## 2025-08-26 DIAGNOSIS — Z14.1 CYSTIC FIBROSIS CARRIER: ICD-10-CM

## 2025-08-26 DIAGNOSIS — B00.9 HERPES VIRUS INFECTION IN MOTHER DURING THIRD TRIMESTER OF PREGNANCY: ICD-10-CM

## 2025-08-26 DIAGNOSIS — Z67.91 RH NEGATIVE STATE IN ANTEPARTUM PERIOD: ICD-10-CM

## 2025-08-26 DIAGNOSIS — R87.612 LGSIL ON PAP SMEAR OF CERVIX: ICD-10-CM

## 2025-08-26 DIAGNOSIS — O26.899 RH NEGATIVE STATE IN ANTEPARTUM PERIOD: ICD-10-CM

## 2025-08-26 DIAGNOSIS — Z34.03 PRIMIGRAVIDA IN THIRD TRIMESTER: Primary | ICD-10-CM

## 2025-08-26 DIAGNOSIS — O98.513 HERPES VIRUS INFECTION IN MOTHER DURING THIRD TRIMESTER OF PREGNANCY: ICD-10-CM

## 2025-08-26 PROCEDURE — 0502F SUBSEQUENT PRENATAL CARE: CPT | Performed by: OBSTETRICS & GYNECOLOGY

## 2025-09-04 ENCOUNTER — ROUTINE PRENATAL (OUTPATIENT)
Dept: OBGYN CLINIC | Age: 35
End: 2025-09-04

## 2025-09-04 VITALS — BODY MASS INDEX: 32.1 KG/M2 | SYSTOLIC BLOOD PRESSURE: 118 MMHG | DIASTOLIC BLOOD PRESSURE: 72 MMHG | WEIGHT: 187 LBS

## 2025-09-04 DIAGNOSIS — Z34.03 PRIMIGRAVIDA IN THIRD TRIMESTER: Primary | ICD-10-CM

## 2025-09-04 DIAGNOSIS — O26.899 RH NEGATIVE STATE IN ANTEPARTUM PERIOD: ICD-10-CM

## 2025-09-04 DIAGNOSIS — O98.513 HERPES VIRUS INFECTION IN MOTHER DURING THIRD TRIMESTER OF PREGNANCY: ICD-10-CM

## 2025-09-04 DIAGNOSIS — B00.9 HERPES VIRUS INFECTION IN MOTHER DURING THIRD TRIMESTER OF PREGNANCY: ICD-10-CM

## 2025-09-04 DIAGNOSIS — Z36.85 SCREENING, ANTENATAL, FOR STREPTOCOCCUS B: ICD-10-CM

## 2025-09-04 DIAGNOSIS — O99.013 ANEMIA AFFECTING PREGNANCY IN THIRD TRIMESTER: ICD-10-CM

## 2025-09-04 DIAGNOSIS — Z3A.37 37 WEEKS GESTATION OF PREGNANCY: ICD-10-CM

## 2025-09-04 DIAGNOSIS — Z14.1 CYSTIC FIBROSIS CARRIER: ICD-10-CM

## 2025-09-04 DIAGNOSIS — G43.009 MIGRAINE WITHOUT AURA AND WITHOUT STATUS MIGRAINOSUS, NOT INTRACTABLE: ICD-10-CM

## 2025-09-04 DIAGNOSIS — Z67.91 RH NEGATIVE STATE IN ANTEPARTUM PERIOD: ICD-10-CM

## 2025-09-04 DIAGNOSIS — R87.612 LGSIL ON PAP SMEAR OF CERVIX: ICD-10-CM

## 2025-09-07 PROBLEM — B95.1 POSITIVE GBS TEST: Status: ACTIVE | Noted: 2025-09-07

## 2025-09-07 LAB
BACTERIA SPEC CULT: ABNORMAL
SERVICE CMNT-IMP: ABNORMAL